# Patient Record
Sex: FEMALE | Race: WHITE
[De-identification: names, ages, dates, MRNs, and addresses within clinical notes are randomized per-mention and may not be internally consistent; named-entity substitution may affect disease eponyms.]

---

## 2018-10-16 ENCOUNTER — HOSPITAL ENCOUNTER (OUTPATIENT)
Dept: HOSPITAL 89 - RAD | Age: 78
End: 2018-10-16
Attending: PHYSICIAN ASSISTANT
Payer: MEDICARE

## 2018-10-16 DIAGNOSIS — M85.80: Primary | ICD-10-CM

## 2018-10-16 PROCEDURE — 77080 DXA BONE DENSITY AXIAL: CPT

## 2018-10-16 NOTE — RADIOLOGY IMAGING REPORT
[place]AC[place] [facility] [place] 

 

PATIENT NAME: Christi Pryor

: 1940

MR: [id number]

V: [id number]

EXAM DATE: 

ORDERING PHYSICIAN: MICK REYNOLDS

TECHNOLOGIST: 

 

Location: [facility]

Patient: Christi Pryor

: 1940

MRN: [id number]

Visit/Account:[id number]

Date of Sevice: 10/16/2018

 

ACCESSION #: [id number]

 

DEXA Scan

 

Clinical history:  Postmenopausal, family history of osteoporosis, hypothyroidism.

 

Comparison: None available.

 

LUMBAR SPINE:

The bone mineral density (BMD) measured from L1-L4 correlates with a Z-score -0.1 and a T-score of -1
.7 which is osteopenia as defined by the World Health Organization.  The corresponding risk of fractu
re in the lumbar spine is 3-4 times increased compared with a young adult reference population.

 

HIP:

Bone mineral density (BMD) measured in the Left total hip region correlates with a Z-score 0.1 and a 
T-score of -1.7 which is osteopenia as defined by the World Health Organization.  The corresponding r
isk of fracture in the hip is 3-4 times increased compared with a young adult reference population.  
 T score left femoral neck -2.3

 

Bone mineral density (BMD) measured in the Femoral Neck region measures 0.725 g/cm2.

 

Impression:

1.  Lumbar spine:  Osteopenia.

2.  Left Hip:  Osteopenia.

3.  Femoral Neck:  Bone Mineral Density is 0.725 g/cm2

 

The next DEXA scan of this patient should include the following sites: L1-L4 and the left hip.

 

FRAX? WHO Fracture Risk Assessment Tool link:

<http://www.shef.ac.uk/FRAX/tool.jsp?locationValue=9>

 

PLEASE NOTE:

1)  The World Health Organization defines low BMD as follows:

                                                                       T-score

                   Normal                                  > -1

                   Osteopenia                           < -1 and  > -2.5

                   Osteoporosis                         < -2.5 without fractures

                   Established osteoporosis       < -2.5 with fractures

2)  In general, you may wish to consider:

                    Diagnosis                  Treatment                       Follow-up DEXA

 

                    Normal BMD            Prevention                      2-3 years

                    Osteopenia                Prevention/therapy         1-2 years

                    Osteoporosis             Therapy                           Yearly

3)  Fracture risk estimated from the T-score is more accurate for vertebral fractures (often spontane
ous) than for hip fractures.

 

 

Report Dictated By: Magy Man MD at 10/16/2018 10:32 AM

 

Report E-Signed By: Magy Man MD  at 10/16/2018 10:34 AM

 

WSN:[email or web address]